# Patient Record
Sex: MALE
[De-identification: names, ages, dates, MRNs, and addresses within clinical notes are randomized per-mention and may not be internally consistent; named-entity substitution may affect disease eponyms.]

---

## 2022-08-18 PROBLEM — Z00.00 ENCOUNTER FOR PREVENTIVE HEALTH EXAMINATION: Status: ACTIVE | Noted: 2022-08-18

## 2022-08-25 ENCOUNTER — APPOINTMENT (OUTPATIENT)
Dept: ORTHOPEDIC SURGERY | Facility: CLINIC | Age: 42
End: 2022-08-25

## 2022-08-25 VITALS — BODY MASS INDEX: 23.49 KG/M2 | WEIGHT: 155 LBS | HEIGHT: 68 IN

## 2022-08-25 DIAGNOSIS — M25.311 OTHER INSTABILITY, RIGHT SHOULDER: ICD-10-CM

## 2022-08-25 DIAGNOSIS — Z78.9 OTHER SPECIFIED HEALTH STATUS: ICD-10-CM

## 2022-08-25 DIAGNOSIS — Z87.19 PERSONAL HISTORY OF OTHER DISEASES OF THE DIGESTIVE SYSTEM: ICD-10-CM

## 2022-08-25 DIAGNOSIS — M25.512 PAIN IN LEFT SHOULDER: ICD-10-CM

## 2022-08-25 DIAGNOSIS — M54.2 CERVICALGIA: ICD-10-CM

## 2022-08-25 DIAGNOSIS — Z82.62 FAMILY HISTORY OF OSTEOPOROSIS: ICD-10-CM

## 2022-08-25 DIAGNOSIS — Z87.09 PERSONAL HISTORY OF OTHER DISEASES OF THE RESPIRATORY SYSTEM: ICD-10-CM

## 2022-08-25 DIAGNOSIS — G89.29 PAIN IN LEFT SHOULDER: ICD-10-CM

## 2022-08-25 PROCEDURE — 73030 X-RAY EXAM OF SHOULDER: CPT | Mod: LT

## 2022-08-25 PROCEDURE — 99204 OFFICE O/P NEW MOD 45 MIN: CPT

## 2022-08-25 RX ORDER — OMEPRAZOLE 20 MG/1
TABLET, DELAYED RELEASE ORAL
Refills: 0 | Status: ACTIVE | COMMUNITY

## 2022-08-25 NOTE — PHYSICAL EXAM
[UE] : Sensory: Intact in bilateral upper extremities [Normal RUE] : Right Upper Extremity: No scars, rashes, lesions, ulcers, skin intact [Normal LUE] : Left Upper Extremity: No scars, rashes, lesions, ulcers, skin intact [Normal Touch] : sensation intact for touch [Normal] : Gait: normal [de-identified] : Bilateral shoulders\par Well-healed portal incisions right shoulder.\par Well developed.  No atrophy.  No deformity.\par No edema, ecchymosis, erythema.\par Bilateral shoulder range of motion is with 180 degrees forward elevation and internal rotation to T8 and approximately 80 degrees external rotation with the arms at the side.\par Passive range of motion in 90 degrees abduction is with 90 degrees external rotation and 65 degrees internal rotation without pain.\par 5/5 supraspinatus, internal rotation, external rotation, biceps.\par Negative Neer and [place] and speeds and [place]'s.  Negative apprehension left\par Cervical spine range of motion is intact. [de-identified] : \par X-rays taken today of bilateral shoulders AP, Y lateral and axillary views today show possibly some mild narrowing right glenohumeral joint inferiorly on the AP view but less apparent on the axillary view.  Left shoulder is unremarkable.

## 2022-08-25 NOTE — ASSESSMENT
[FreeTextEntry1] : 40 y/o male with history of right shoulder dislocations, multiple in the past treated with a labral repair or capsulorrhaphy probably done about 11 years ago but I will try to get the operative report.  He had multiple dislocations for a few years prior to that surgery.  He has not had any instability in the last 2 years which is encouraging but he knows how to avoid things.  He is relatively active.  I recommended doing some strengthening and if his shoulder bothers him we could work it up.  A J procedure may be something to consider if he is having instability.  X-rays show that there probably is some early arthritis although there was joint space narrowing on an x-ray I can see done at the hospital about 11 years ago that may have been consistent with mild arthritis at that time as well.\par Fortunately the right shoulder is not hurting right now but his left shoulder started hurting related to weightlifting.  There has never been a dislocation of his left shoulder.  There is good motion and good strength in both shoulders.\par I recommended first trying a course of physical therapy and an anti-inflammatory.  He will try taking Aleve 2 tablets once or twice a day with meals but should stop if it aggravates his reflux.  He is on omeprazole.\par He was given home exercises as well.\par He has some chronic neck pain without radiculopathy and they can work on the neck and therapy as well.\par If he is not better in the next 2 months he should follow-up either in person or at least on a telehealth and we may need to do further work-up including an MRI or MR arthrogram.

## 2022-08-25 NOTE — HISTORY OF PRESENT ILLNESS
[de-identified] : Mr. Buchanan is a 41 year old right-hand-dominant male who comes in for evaluation for LEFT shoulder pain that started about mid April. He had recently been getting back into working out so he thinks this is from lifting but cant identify a specific exercises that caused the pain.  He had been doing some bench press which may have caused it.  It's been gradually worsening or more noticeable. He also feels some crunching in shoulder.  Pain is intermittent and localized to the shoulder. He has sharp pain with lifting weights like chest and shoulder exercises. He sometimes has pain pushing himself up to get out of bed or bearing weight through arm. He is not taking any pain medications because pain isn't significant at this point, rating it 1/10. No pain medications. \par He had a labral repair on his RIGHT shoulder about 10 years ago. He has had postop subluxations since then but had no pain with it. Last time is dislocated or subluxated was a couple years ago.  He is also having pain in the neck more recently and feels clicking and cracking but no radiation into his arms.

## 2022-10-27 ENCOUNTER — APPOINTMENT (OUTPATIENT)
Dept: ORTHOPEDIC SURGERY | Facility: CLINIC | Age: 42
End: 2022-10-27

## 2023-11-16 ENCOUNTER — APPOINTMENT (OUTPATIENT)
Dept: UROLOGY | Facility: CLINIC | Age: 43
End: 2023-11-16
Payer: COMMERCIAL

## 2023-11-16 VITALS
SYSTOLIC BLOOD PRESSURE: 133 MMHG | HEART RATE: 50 BPM | BODY MASS INDEX: 23.7 KG/M2 | WEIGHT: 160 LBS | DIASTOLIC BLOOD PRESSURE: 77 MMHG | OXYGEN SATURATION: 98 % | TEMPERATURE: 98.2 F | HEIGHT: 69 IN

## 2023-11-16 DIAGNOSIS — Z78.9 OTHER SPECIFIED HEALTH STATUS: ICD-10-CM

## 2023-11-16 DIAGNOSIS — M62.89 OTHER SPECIFIED DISORDERS OF MUSCLE: ICD-10-CM

## 2023-11-16 PROCEDURE — 99204 OFFICE O/P NEW MOD 45 MIN: CPT

## 2023-11-17 LAB
APPEARANCE: CLEAR
BACTERIA: NEGATIVE /HPF
BILIRUBIN URINE: NEGATIVE
BLOOD URINE: NEGATIVE
CAST: 0 /LPF
COLOR: YELLOW
EPITHELIAL CELLS: 0 /HPF
GLUCOSE QUALITATIVE U: NEGATIVE MG/DL
KETONES URINE: NEGATIVE MG/DL
LEUKOCYTE ESTERASE URINE: NEGATIVE
MICROSCOPIC-UA: NORMAL
NITRITE URINE: NEGATIVE
PH URINE: 7
PROTEIN URINE: NEGATIVE MG/DL
RED BLOOD CELLS URINE: NORMAL /HPF
REVIEW: NORMAL
SPECIFIC GRAVITY URINE: 1.01
UROBILINOGEN URINE: 0.2 MG/DL
WHITE BLOOD CELLS URINE: 0 /HPF

## 2023-11-20 LAB — BACTERIA UR CULT: NORMAL

## 2023-11-27 LAB
MYCOPLASMA HOMINIS CULTURE: NEGATIVE
UREAPLASMA CULTURE: NEGATIVE

## 2025-02-20 ENCOUNTER — APPOINTMENT (OUTPATIENT)
Dept: UROLOGY | Facility: CLINIC | Age: 45
End: 2025-02-20
Payer: COMMERCIAL

## 2025-02-20 VITALS
WEIGHT: 160 LBS | HEART RATE: 72 BPM | HEIGHT: 69 IN | BODY MASS INDEX: 23.7 KG/M2 | TEMPERATURE: 98.9 F | DIASTOLIC BLOOD PRESSURE: 82 MMHG | SYSTOLIC BLOOD PRESSURE: 126 MMHG

## 2025-02-20 PROCEDURE — 99213 OFFICE O/P EST LOW 20 MIN: CPT
